# Patient Record
Sex: MALE | Race: WHITE | Employment: FULL TIME | URBAN - METROPOLITAN AREA
[De-identification: names, ages, dates, MRNs, and addresses within clinical notes are randomized per-mention and may not be internally consistent; named-entity substitution may affect disease eponyms.]

---

## 2017-05-12 ENCOUNTER — OFFICE VISIT (OUTPATIENT)
Dept: INTERNAL MEDICINE CLINIC | Age: 52
End: 2017-05-12

## 2017-05-12 ENCOUNTER — HOSPITAL ENCOUNTER (OUTPATIENT)
Dept: GENERAL RADIOLOGY | Age: 52
Discharge: HOME | End: 2017-05-12
Payer: COMMERCIAL

## 2017-05-12 ENCOUNTER — APPOINTMENT (OUTPATIENT)
Dept: LAB | Age: 52
End: 2017-05-12
Payer: COMMERCIAL

## 2017-05-12 ENCOUNTER — HOSPITAL ENCOUNTER (OUTPATIENT)
Dept: LAB | Age: 52
Discharge: HOME | End: 2017-05-12
Payer: COMMERCIAL

## 2017-05-12 VITALS
WEIGHT: 207 LBS | HEIGHT: 68 IN | TEMPERATURE: 97.3 F | RESPIRATION RATE: 16 BRPM | OXYGEN SATURATION: 97 % | SYSTOLIC BLOOD PRESSURE: 110 MMHG | HEART RATE: 68 BPM | DIASTOLIC BLOOD PRESSURE: 70 MMHG | BODY MASS INDEX: 31.37 KG/M2

## 2017-05-12 DIAGNOSIS — Z00.00 ROUTINE GENERAL MEDICAL EXAMINATION AT A HEALTH CARE FACILITY: Primary | ICD-10-CM

## 2017-05-12 DIAGNOSIS — R05.9 COUGH: ICD-10-CM

## 2017-05-12 DIAGNOSIS — Z00.00 ROUTINE GENERAL MEDICAL EXAMINATION AT A HEALTH CARE FACILITY: ICD-10-CM

## 2017-05-12 LAB
ALBUMIN SERPL BCP-MCNC: 4.1 G/DL (ref 3.4–5)
ALBUMIN/GLOB SERPL: 1.5 {RATIO} (ref 0.8–1.7)
ALP SERPL-CCNC: 58 U/L (ref 45–117)
ALT SERPL-CCNC: 22 U/L (ref 16–61)
ANION GAP BLD CALC-SCNC: 7 MMOL/L (ref 3–18)
AST SERPL W P-5'-P-CCNC: 15 U/L (ref 15–37)
BASOPHILS # BLD AUTO: 0 K/UL (ref 0–0.06)
BASOPHILS # BLD: 1 % (ref 0–2)
BILIRUB SERPL-MCNC: 0.5 MG/DL (ref 0.2–1)
BUN SERPL-MCNC: 15 MG/DL (ref 7–18)
BUN/CREAT SERPL: 15 (ref 12–20)
CALCIUM SERPL-MCNC: 8.7 MG/DL (ref 8.5–10.1)
CHLORIDE SERPL-SCNC: 106 MMOL/L (ref 100–108)
CHOLEST SERPL-MCNC: 153 MG/DL
CO2 SERPL-SCNC: 26 MMOL/L (ref 21–32)
CREAT SERPL-MCNC: 0.98 MG/DL (ref 0.6–1.3)
DIFFERENTIAL METHOD BLD: ABNORMAL
EOSINOPHIL # BLD: 0.1 K/UL (ref 0–0.4)
EOSINOPHIL NFR BLD: 1 % (ref 0–5)
ERYTHROCYTE [DISTWIDTH] IN BLOOD BY AUTOMATED COUNT: 13.1 % (ref 11.6–14.5)
GLOBULIN SER CALC-MCNC: 2.7 G/DL (ref 2–4)
GLUCOSE SERPL-MCNC: 100 MG/DL (ref 74–99)
HCT VFR BLD AUTO: 44.3 % (ref 36–48)
HDLC SERPL-MCNC: 42 MG/DL (ref 40–60)
HDLC SERPL: 3.6 {RATIO} (ref 0–5)
HGB BLD-MCNC: 14.9 G/DL (ref 13–16)
LDLC SERPL CALC-MCNC: 91 MG/DL (ref 0–100)
LIPID PROFILE,FLP: NORMAL
LYMPHOCYTES # BLD AUTO: 29 % (ref 21–52)
LYMPHOCYTES # BLD: 2.2 K/UL (ref 0.9–3.6)
MCH RBC QN AUTO: 31 PG (ref 24–34)
MCHC RBC AUTO-ENTMCNC: 33.6 G/DL (ref 31–37)
MCV RBC AUTO: 92.3 FL (ref 74–97)
MONOCYTES # BLD: 0.4 K/UL (ref 0.05–1.2)
MONOCYTES NFR BLD AUTO: 5 % (ref 3–10)
NEUTS SEG # BLD: 4.9 K/UL (ref 1.8–8)
NEUTS SEG NFR BLD AUTO: 64 % (ref 40–73)
PLATELET # BLD AUTO: 199 K/UL (ref 135–420)
PMV BLD AUTO: 12.4 FL (ref 9.2–11.8)
POTASSIUM SERPL-SCNC: 4.1 MMOL/L (ref 3.5–5.5)
PROT SERPL-MCNC: 6.8 G/DL (ref 6.4–8.2)
PSA SERPL-MCNC: 1 NG/ML (ref 0–4)
RBC # BLD AUTO: 4.8 M/UL (ref 4.7–5.5)
SODIUM SERPL-SCNC: 139 MMOL/L (ref 136–145)
TRIGL SERPL-MCNC: 100 MG/DL (ref ?–150)
VLDLC SERPL CALC-MCNC: 20 MG/DL
WBC # BLD AUTO: 7.7 K/UL (ref 4.6–13.2)

## 2017-05-12 PROCEDURE — 80053 COMPREHEN METABOLIC PANEL: CPT | Performed by: INTERNAL MEDICINE

## 2017-05-12 PROCEDURE — 85025 COMPLETE CBC W/AUTO DIFF WBC: CPT | Performed by: INTERNAL MEDICINE

## 2017-05-12 PROCEDURE — 71020 XR CHEST PA LAT: CPT

## 2017-05-12 PROCEDURE — 84153 ASSAY OF PSA TOTAL: CPT | Performed by: INTERNAL MEDICINE

## 2017-05-12 PROCEDURE — 80061 LIPID PANEL: CPT | Performed by: INTERNAL MEDICINE

## 2017-05-12 RX ORDER — BUPROPION HYDROCHLORIDE 150 MG/1
TABLET, EXTENDED RELEASE ORAL
Qty: 60 TAB | Refills: 6 | Status: SHIPPED | OUTPATIENT
Start: 2017-05-12 | End: 2017-12-18 | Stop reason: SDUPTHER

## 2017-05-12 NOTE — PROGRESS NOTES
1. Have you been to the ER, urgent care clinic since your last visit? Hospitalized since your last visit? No    2. Have you seen or consulted any other health care providers outside of the [address] [place] [address] since your last visit? Include any pap smears or colon screening.  No

## 2017-05-12 NOTE — PATIENT INSTRUCTIONS

## 2017-05-12 NOTE — MR AVS SNAPSHOT
Visit Information Date & Time Provider Department Dept. Phone Encounter #  
 5/12/2017 10:45 AM Marlon Interiano MD [facility] INTERNAL MEDICINE OF Bhavesh Andrade [phone] [id number] Follow-up Instructions Return if symptoms worsen or fail to improve. Your Appointments 6/22/2017  9:15 AM  
New Patient with Omar Cameron MD  
[facility] ([address]) Appt Note: NP, testicular lump, self refd [address] [place] [address]  
[address] [place] [address] Upcoming Health Maintenance Date Due DTaP/Tdap/Td series (1 - Tdap) 3/9/1988 FOBT Q 1 YEAR AGE 50-75 3/9/2017 INFLUENZA AGE 9 TO ADULT 8/1/2017 Allergies as of 5/12/2017  Review Complete On: 5/12/2017 By: Marlon Interiano MD  
 No Known Allergies Current Immunizations  Never Reviewed No immunizations on file. Not reviewed this visit You Were Diagnosed With   
  
 Codes Comments Routine general medical examination at a health care facility    -  Primary ICD-10-CM: Z00.00 ICD-9-CM: V70.0 Cough     ICD-10-CM: R05 ICD-9-CM: 170. 2 Vitals BP Pulse Temp Resp Height(growth percentile) 110/70 (BP 1 Location: Left arm, BP Patient Position: Sitting) 68 97.3 °F (36.3 °C) (Tympanic) 16 5' 8\" (1.727 m) Weight(growth percentile) SpO2 BMI Smoking Status 207 lb (93.9 kg) 97% 31.47 kg/m2 Heavy Tobacco Smoker BMI and BSA Data Body Mass Index Body Surface Area  
 31.47 kg/m 2 2.12 m 2 Preferred Pharmacy Pharmacy Name Phone [facility] [address] [phone] Your Updated Medication List  
  
   
This list is accurate as of: 5/12/17 11:10 AM.  Always use your most recent med list.  
  
  
  
  
 buPROPion  mg SR tablet Commonly known as:  Nara Sullivan  
 1 every day for 3 days then increase to 1 bid Prescriptions Sent to Pharmacy Refills buPROPion SR (WELLBUTRIN SR) 150 mg SR tablet 6 Si every day for 3 days then increase to 1 bid Class: Normal  
 Pharmacy: [facility] [address] [place] [address] Jean Rubio [address] [place] [address] #: [phone] Follow-up Instructions Return if symptoms worsen or fail to improve. To-Do List   
 2017 Imaging:  XR CHEST PA LAT Patient Instructions Well Visit, Men 48 to 72: Care Instructions Your Care Instructions Physical exams can help you stay healthy. Your doctor has checked your overall health and may have suggested ways to take good care of yourself. He or she also may have recommended tests. At home, you can help prevent illness with healthy eating, regular exercise, and other steps. Follow-up care is a key part of your treatment and safety. Be sure to make and go to all appointments, and call your doctor if you are having problems. It's also a good idea to know your test results and keep a list of the medicines you take. How can you care for yourself at home? · Reach and stay at a healthy weight. This will lower your risk for many problems, such as obesity, diabetes, heart disease, and high blood pressure. · Get at least 30 minutes of exercise on most days of the week. Walking is a good choice. You also may want to do other activities, such as running, swimming, cycling, or playing tennis or team sports. · Do not smoke. Smoking can make health problems worse. If you need help quitting, talk to your doctor about stop-smoking programs and medicines. These can increase your chances of quitting for good. · Protect your skin from too much sun. When you're outdoors from 10 a.m. to 4 p.m., stay in the shade or cover up with clothing and a hat with a wide brim. Wear sunglasses that block UV rays.  Even when it's cloudy, put broad-spectrum sunscreen (SPF 30 or higher) on any exposed skin. · See a dentist one or two times a year for checkups and to have your teeth cleaned. · Wear a seat belt in the car. · Limit alcohol to 2 drinks a day. Too much alcohol can cause health problems. Follow your doctor's advice about when to have certain tests. These tests can spot problems early. · Cholesterol. Your doctor will tell you how often to have this done based on your overall health and other things that can increase your risk for heart attack and stroke. · Blood pressure. Have your blood pressure checked during a routine doctor visit. Your doctor will tell you how often to check your blood pressure based on your age, your blood pressure results, and other factors. · Prostate exam. Talk to your doctor about whether you should have a blood test (called a PSA test) for prostate cancer. Experts disagree on whether men should have this test. Some experts recommend that you discuss the benefits and risks of the test with your doctor. · Diabetes. Ask your doctor whether you should have tests for diabetes. · Vision. Some experts recommend that you have yearly exams for glaucoma and other age-related eye problems starting at age 48. · Hearing. Tell your doctor if you notice any change in your hearing. You can have tests to find out how well you hear. · Colon cancer. You should begin tests for colon cancer at age 48. You may have one of several tests. Your doctor will tell you how often to have tests based on your age and risk. Risks include whether you already had a precancerous polyp removed from your colon or whether your parent, brother, sister, or child has had colon cancer. · Heart attack and stroke risk. At least every 4 to 6 years, you should have your risk for heart attack and stroke assessed.  Your doctor uses factors such as your age, blood pressure, cholesterol, and whether you smoke or have diabetes to show what your risk for a heart attack or stroke is over the next 10 years. · Abdominal aortic aneurysm. Ask your doctor whether you should have a test to check for an aneurysm. You may need a test if you ever smoked or if your parent, brother, sister, or child has had an aneurysm. When should you call for help? Watch closely for changes in your health, and be sure to contact your doctor if you have any problems or symptoms that concern you. Where can you learn more? Go to http://samir-nael.info/. Enter D950 in the search box to learn more about \"Well Visit, Men 48 to 72: Care Instructions. \" Current as of: July 19, 2016 Content Version: 11.2 © 8636-5698 [employer]. Care instructions adapted under license by [employer] (which disclaims liability or warranty for this information). If you have questions about a medical condition or this instruction, always ask your healthcare professional. [place] [address] any warranty or liability for your use of this information. Introducing [facility] & HEALTH SERVICES! [facility] introduces [employer] patient portal. Now you can access parts of your medical record, email your doctor's office, and request medication refills online. 1. In your internet browser, go to [email or web address] 2. Click on the First Time User? Click Here link in the Sign In box. You will see the New Member Sign Up page. 3. Enter your [employer] Access Code exactly as it appears below. You will not need to use this code after youve completed the sign-up process. If you do not sign up before the expiration date, you must request a new code. · [employer] Access Code: [email or web address] Expires: 8/10/2017 10:24 AM 
 
4. Enter the last four digits of your Social Security Number (xxxx) and Date of Birth (mm/dd/yyyy) as indicated and click Submit. You will be taken to the next sign-up page. 5. Create a [employer] ID. This will be your [employer] login ID and cannot be changed, so think of one that is secure and easy to remember. 6. Create a [employer] password. You can change your password at any time. 7. Enter your Password Reset Question and Answer. This can be used at a later time if you forget your password. 8. Enter your e-mail address. You will receive e-mail notification when new information is available in [address] 9. Click Sign Up. You can now view and download portions of your medical record. 10. Click the Download Summary menu link to download a portable copy of your medical information. If you have questions, please visit the Frequently Asked Questions section of the [employer] website. Remember, [employer] is NOT to be used for urgent needs. For medical emergencies, dial 911. Now available from your iPhone and Android! Please provide this summary of care documentation to your next provider. Your primary care clinician is listed as Berny Wadsworth. If you have any questions after today's visit, please call [phone].

## 2017-05-12 NOTE — PROGRESS NOTES
HPI:   NP evaluation  PMED hx notable for GERD (diet controlled)  Requests wellbutrin for tob cessation  Chronic intermittent cough (mild)  w/o chest pain/abd. discomfort; no dyspnea or pedal edema; denies constitutional complaints of fever, night sweats or wt loss; no evidence of GI/ hemorrhage; no polyuria/polydipsia. Activity is age appropriate. ROS is otherwise negative. Past Medical History:   Diagnosis Date    ADHD (attention deficit hyperactivity disorder)     GERD (gastroesophageal reflux disease)     Renal cyst     (R)       Past Surgical History:   Procedure Laterality Date    HX TONSIL AND ADENOIDECTOMY         Social History     Social History    Marital status: UNKNOWN     Spouse name: N/A    Number of children: N/A    Years of education: N/A     Occupational History    technician      Social History Main Topics    Smoking status: Heavy Tobacco Smoker    Smokeless tobacco: Not on file    Alcohol use 6.0 oz/week     10 Cans of beer per week    Drug use: No    Sexual activity: Not on file     Other Topics Concern    Not on file     Social History Narrative    No narrative on file       No Known Allergies    Family History   Problem Relation Age of Onset    Heart Disease Father     Hypertension Father          Meds: none      Visit Vitals    /70 (BP 1 Location: Left arm, BP Patient Position: Sitting)    Pulse 68    Temp 97.3 °F (36.3 °C) (Tympanic)    Resp 16    Ht 5' 8\" (1.727 m)    Wt 207 lb (93.9 kg)    SpO2 97%    BMI 31.47 kg/m2       PE  Well nourished in NAD  HEENT: PERRLA, EOMI;  OP: clear. Neck: supple w/o mass or bruits. Chest: clear. CV: RRR w/o m,r,g; pulses intact. Abd: soft, NT, w/o HSM or mass. Rectal: heme neg; prostate 3 FBS and smooth  Ext: w/o edema. Neuro: NF. Assessment and Plan    Encounter Diagnoses   Name Primary?     Routine general medical examination at a health care facility Yes    Cough    Except for his weight and tob use, appears well  Labs to assess metabolic parameters; colo due 2018 (hx of polyps)  Wellbutrin  mg every day for 3 days then increase to bid to help with tob cessation  I have reviewed/discussed the above normal BMI with the patient. I have recommended the following interventions: dietary management education, guidance, and counseling .  .    OV 12 mos or prn  I have explained plan to patient and the patient verbalizes understanding

## 2017-06-22 PROBLEM — N28.1 RENAL CYST: Status: ACTIVE | Noted: 2017-06-22

## 2017-06-22 PROBLEM — F17.210 CIGARETTE SMOKER: Status: ACTIVE | Noted: 2017-06-22

## 2017-07-18 ENCOUNTER — HOSPITAL ENCOUNTER (OUTPATIENT)
Dept: ULTRASOUND IMAGING | Age: 52
Discharge: HOME | End: 2017-07-18
Attending: UROLOGY
Payer: COMMERCIAL

## 2017-07-18 DIAGNOSIS — F17.210 CIGARETTE SMOKER: ICD-10-CM

## 2017-07-18 DIAGNOSIS — N28.1 RENAL CYST: ICD-10-CM

## 2017-07-18 PROCEDURE — 76770 US EXAM ABDO BACK WALL COMP: CPT

## 2017-08-21 ENCOUNTER — OFFICE VISIT (OUTPATIENT)
Dept: INTERNAL MEDICINE CLINIC | Age: 52
End: 2017-08-21

## 2017-08-21 VITALS
WEIGHT: 204 LBS | SYSTOLIC BLOOD PRESSURE: 138 MMHG | TEMPERATURE: 97.8 F | HEART RATE: 78 BPM | BODY MASS INDEX: 30.92 KG/M2 | OXYGEN SATURATION: 96 % | HEIGHT: 68 IN | RESPIRATION RATE: 16 BRPM | DIASTOLIC BLOOD PRESSURE: 80 MMHG

## 2017-08-21 DIAGNOSIS — R00.2 PALPITATIONS: Primary | ICD-10-CM

## 2017-08-21 DIAGNOSIS — R07.89 ATYPICAL CHEST PAIN: ICD-10-CM

## 2017-08-21 NOTE — PROGRESS NOTES
1. Have you been to the ER, urgent care clinic since your last visit? Hospitalized since your last visit? No    2. Have you seen or consulted any other health care providers outside of the [address] [place] [address] since your last visit? Include any pap smears or colon screening.  No

## 2017-08-21 NOTE — PROGRESS NOTES
HPI:   2 days of fleeting CP with palpitations lasting seconds w/o associated dyspnea, pedal edema; pain aggravated by positional change; occurred post becoming overheated doing yard work    ROS is otherwise negative. Past Medical History:   Diagnosis Date    ADHD (attention deficit hyperactivity disorder)     GERD (gastroesophageal reflux disease)     Renal cyst     (R)       Past Surgical History:   Procedure Laterality Date    HX COLONOSCOPY  2013    polyp    HX TONSIL AND ADENOIDECTOMY         Social History     Social History    Marital status: UNKNOWN     Spouse name: N/A    Number of children: N/A    Years of education: N/A     Occupational History    technician      Social History Main Topics    Smoking status: Heavy Tobacco Smoker    Smokeless tobacco: Never Used    Alcohol use 6.0 oz/week     10 Cans of beer per week    Drug use: No    Sexual activity: Not on file     Other Topics Concern    Not on file     Social History Narrative       No Known Allergies    Family History   Problem Relation Age of Onset    Heart Disease Father     Hypertension Father        Current Outpatient Prescriptions   Medication Sig Dispense Refill    buPROPion SR (WELLBUTRIN SR) 150 mg SR tablet 1 every day for 3 days then increase to 1 bid 60 Tab 6           Visit Vitals    /80 (BP 1 Location: Left arm, BP Patient Position: Sitting)    Pulse 78    Temp 97.8 °F (36.6 °C) (Tympanic)    Resp 16    Ht 5' 8\" (1.727 m)    Wt 204 lb (92.5 kg)    SpO2 96%    BMI 31.02 kg/m2       PE  Well nourished in NAD  HEENT:  OP: clear. Neck: supple w/o mass or bruits. Chest: clear. Chest wall: mild (L) anterior tenderness reproducing his pain  CV: RRR w/o m,r,g; pulses intact. Abd: soft, NT, w/o HSM or mass. Ext: w/o edema. Neuro: NF.  ECG: WNL    Assessment and Plan    Encounter Diagnoses   Name Primary?     Atypical chest pain Yes       Atypical CP/palpitations - reassured; increase fluids; cautioned pt about taking breaks when working in heat  to ER if worsens; call if unimproved 7 days  I have reviewed/discussed the above normal BMI with the patient. I have recommended the following interventions: dietary management education, guidance, and counseling .  .    OV 6 mos or prn  I have explained plan to patient and the patient verbalizes understanding

## 2017-08-21 NOTE — PATIENT INSTRUCTIONS
Chest Pain: Care Instructions  Your Care Instructions  There are many things that can cause chest pain. Some are not serious and will get better on their own in a few days. But some kinds of chest pain need more testing and treatment. Your doctor may have recommended a follow-up visit in the next 8 to 12 hours. If you are not getting better, you may need more tests or treatment. Even though your doctor has released you, you still need to watch for any problems. The doctor carefully checked you, but sometimes problems can develop later. If you have new symptoms or if your symptoms do not get better, get medical care right away. If you have worse or different chest pain or pressure that lasts more than 5 minutes or you passed out (lost consciousness), call 911 or seek other emergency help right away. A medical visit is only one step in your treatment. Even if you feel better, you still need to do what your doctor recommends, such as going to all suggested follow-up appointments and taking medicines exactly as directed. This will help you recover and help prevent future problems. How can you care for yourself at home? · Rest until you feel better. · Take your medicine exactly as prescribed. Call your doctor if you think you are having a problem with your medicine. · Do not drive after taking a prescription pain medicine. When should you call for help? Call 911 if:  · You passed out (lost consciousness). · You have severe difficulty breathing. · You have symptoms of a heart attack. These may include:  ¨ Chest pain or pressure, or a strange feeling in your chest.  ¨ Sweating. ¨ Shortness of breath. ¨ Nausea or vomiting. ¨ Pain, pressure, or a strange feeling in your back, neck, jaw, or upper belly or in one or both shoulders or arms. ¨ Lightheadedness or sudden weakness. ¨ A fast or irregular heartbeat.   After you call 911, the  may tell you to chew 1 adult-strength or 2 to 4 low-dose aspirin. Wait for an ambulance. Do not try to drive yourself. Call your doctor today if:  · You have any trouble breathing. · Your chest pain gets worse. · You are dizzy or lightheaded, or you feel like you may faint. · You are not getting better as expected. · You are having new or different chest pain. Where can you learn more? Go to http://samir-nael.info/. Enter A120 in the search box to learn more about \"Chest Pain: Care Instructions. \"  Current as of: March 20, 2017  Content Version: 11.3  © 0973-7649 [employer]. Care instructions adapted under license by [employer] (which disclaims liability or warranty for this information). If you have questions about a medical condition or this instruction, always ask your healthcare professional. [address] any warranty or liability for your use of this information.

## 2017-12-13 ENCOUNTER — TELEPHONE (OUTPATIENT)
Dept: INTERNAL MEDICINE CLINIC | Age: 52
End: 2017-12-13

## 2017-12-13 DIAGNOSIS — R41.3 MEMORY DISTURBANCE: Primary | ICD-10-CM

## 2017-12-13 NOTE — TELEPHONE ENCOUNTER
Patient called for referral to Neurology. He has been noticing an increase in memory issues. He is looking for a [address] [place]. Please call him at [phone].

## 2017-12-18 ENCOUNTER — OFFICE VISIT (OUTPATIENT)
Dept: NEUROLOGY | Age: 52
End: 2017-12-18

## 2017-12-18 VITALS
TEMPERATURE: 99.4 F | HEIGHT: 68 IN | HEART RATE: 72 BPM | BODY MASS INDEX: 31.46 KG/M2 | WEIGHT: 207.6 LBS | DIASTOLIC BLOOD PRESSURE: 72 MMHG | OXYGEN SATURATION: 94 % | RESPIRATION RATE: 16 BRPM | SYSTOLIC BLOOD PRESSURE: 122 MMHG

## 2017-12-18 DIAGNOSIS — R06.83 SNORING: ICD-10-CM

## 2017-12-18 DIAGNOSIS — Z86.59 HISTORY OF ATTENTION DEFICIT DISORDER: ICD-10-CM

## 2017-12-18 DIAGNOSIS — Z87.820 HISTORY OF MULTIPLE CONCUSSIONS: ICD-10-CM

## 2017-12-18 DIAGNOSIS — G47.00 INSOMNIA W/ SLEEP APNEA: ICD-10-CM

## 2017-12-18 DIAGNOSIS — R41.3 MEMORY DISTURBANCE: Primary | ICD-10-CM

## 2017-12-18 DIAGNOSIS — G47.30 INSOMNIA W/ SLEEP APNEA: ICD-10-CM

## 2017-12-18 RX ORDER — BUPROPION HYDROCHLORIDE 150 MG/1
TABLET, EXTENDED RELEASE ORAL
Qty: 60 TAB | Refills: 6 | Status: SHIPPED | OUTPATIENT
Start: 2017-12-18

## 2017-12-18 RX ORDER — CALCIUM CARBONATE 200(500)MG
1 TABLET,CHEWABLE ORAL DAILY
COMMUNITY
End: 2018-08-28

## 2017-12-18 NOTE — MR AVS SNAPSHOT
[facility] Jean Rubio [address] [place] [address] [phone] Your Updated Medication List  
  
   
This list is accurate as of: 12/18/17  3:52 PM.  Always use your most recent med list.  
  
  
  
  
 * JAMES-SELTZER ANTACID PO Take  by mouth. * calcium carbonate 200 mg calcium (500 mg) Chew Commonly known as:  TUMS Take 1 Tab by mouth daily. buPROPion  mg SR tablet Commonly known as:  WELLBUTRIN SR  
1 every day for 3 days then increase to 1 bid * Notice: This list has 2 medication(s) that are the same as other medications prescribed for you. Read the directions carefully, and ask your doctor or other care provider to review them with you. We Performed the Following REFERRAL TO NEUROPSYCHOLOGY [OZM10 Custom] Comments:  
 Please evaluate patient for cognitive dysfunction memory loss, history of attention deficit disorder Follow-up Instructions Return in about 2 months (around 2/18/2018). To-Do List   
 12/18/2017 Imaging:  MRI BRAIN WO CONT   
  
 12/18/2017 Lab:  TSH 3RD GENERATION   
  
 12/18/2017 Lab:  VITAMIN B12 Referral Information Referral ID Referred By Referred To  
  
 [id number] Tiffanie FLORES Not Available Visits Status Start Date End Date 1 New Request 12/18/17 12/18/18 If your referral has a status of pending review or denied, additional information will be sent to support the outcome of this decision. Patient Instructions No driving drowsy Introducing [facility] & HEALTH SERVICES! Aleida Velarde introduces [employer] patient portal. Now you can access parts of your medical record, email your doctor's office, and request medication refills online. 1. In your internet browser, go to [email or web address] 2. Click on the First Time User? Click Here link in the Sign In box. You will see the New Member Sign Up page. 3. Enter your [employer] Access Code exactly as it appears below. You will not need to use this code after youve completed the sign-up process. If you do not sign up before the expiration date, you must request a new code. · [employer] Access Code: [email or web address] Expires: 3/18/2018  2:36 PM 
 
4. Enter the last four digits of your Social Security Number (xxxx) and Date of Birth (mm/dd/yyyy) as indicated and click Submit. You will be taken to the next sign-up page. 5. Create a [employer] ID. This will be your [employer] login ID and cannot be changed, so think of one that is secure and easy to remember. 6. Create a [employer] password. You can change your password at any time. 7. Enter your Password Reset Question and Answer. This can be used at a later time if you forget your password. 8. Enter your e-mail address. You will receive e-mail notification when new information is available in [address] 9. Click Sign Up. You can now view and download portions of your medical record. 10. Click the Download Summary menu link to download a portable copy of your medical information. If you have questions, please visit the Frequently Asked Questions section of the [employer] website. Remember, [employer] is NOT to be used for urgent needs. For medical emergencies, dial 911. Now available from your iPhone and Android! Please provide this summary of care documentation to your next provider. Your primary care clinician is listed as Heather Pacheco. If you have any questions after today's visit, please call [phone].

## 2017-12-18 NOTE — PROGRESS NOTES
[facility]             [address] [place], [address]    12/18/2017    HPI:  Andres Tan is a 50 y.o., right handed,   male, who presents with memory disturbance and other cognitive issues. Patient reports that he was previously diagnosed with attention deficit disorder but reports being taking off of Ritalin since he could not reliably remember to take the medication. He reports that gradually he has noted worsening of memory more short-term and recent or long-term. He also notes increased difficulty with concentration easy distractibility some word finding difficulty difficulty focusing and missed placement of items. He does well with calculations. He does report head trauma with 3 concussions in the past.  Patient reports he no longer drinks heavily on average 6 beers a week he continues to smoke. He does note some sleep disturbances attributed to GERD and loud snoring. His girlfriend reports that he stops breathing at night          Ess=7/24; stop bang=6/8    Current Outpatient Prescriptions   Medication Sig Dispense Refill    buPROPion SR (WELLBUTRIN SR) 150 mg SR tablet 1 every day for 3 days then increase to 1 bid 60 Tab 6    CALCIUM CARBONATE (JAMES-SELTZER ANTACID PO) Take  by mouth.  calcium carbonate (TUMS) 200 mg calcium (500 mg) chew Take 1 Tab by mouth daily.          Past Medical History:   Diagnosis Date    ADHD (attention deficit hyperactivity disorder)     Diverticulosis     GERD (gastroesophageal reflux disease)     Renal cyst     (R)       Past Surgical History:   Procedure Laterality Date    HX COLONOSCOPY  2013    polyp    HX TONSIL AND ADENOIDECTOMY       Family History   Problem Relation Age of Onset    Heart Disease Father     Hypertension Father      No Known Allergies    Review of Systems:   Review of Systems - History obtained from the patient  General ROS: positive for  - sleep disturbance  Psychological ROS: positive for - concentration difficulties and memory difficulties  ENT ROS: negative  Hematological and Lymphatic ROS: negative  Endocrine ROS: negative  Respiratory ROS: no cough, shortness of breath, or wheezing  Cardiovascular ROS: no chest pain or dyspnea on exertion  Gastrointestinal ROS: no abdominal pain, change in bowel habits, or black or bloody stools  Genito-Urinary ROS: no dysuria, trouble voiding, or hematuria  Musculoskeletal ROS: negative  Neurological ROS: positive for - memory loss  Dermatological ROS: negative    PHYSICAL EXAMINATION:    Visit Vitals    /72 (BP 1 Location: Left arm, BP Patient Position: Sitting)    Pulse 72    Temp 99.4 °F (37.4 °C) (Oral)    Resp 16    Ht 5' 8\" (1.727 m)    Wt 94.2 kg (207 lb 9.6 oz)    SpO2 94%    BMI 31.57 kg/m2    nc=45 cm  General:  Well defined, nourished, and groomed individual in no acute distress. Neck: Supple, nontender, thyroid within normal limits, no JVD, no bruits, no pain with resistance to active range of motion. Heart: Regular rate and rhythm, no murmurs, rub, or gallop. Normal S1S2. Lungs:  Clear to auscultation bilaterally with equal chest expansion, no cough, no wheeze  Musculoskeletal:  Extremities revealed no edema and had full range of motion of joints. Psych:  Good mood and normal affect    NEUROLOGICAL EXAMINATION:     Mental Status:   Alert and oriented to person, place, and time with recent and remote memory intact. Attention span and concentration are normal. Speech is fluent with a full fund of knowledge. 3/3 items recalled    Cranial Nerves:    II, III, IV, VI:  Visual acuity grossly intact. Visual fields are normal.    Pupils are equal, round, and reactive to light and accommodation. Extra-ocular movements are full and fluid. Fundoscopic exam was not well visualized, no ptosis or nystagmus. V-XII: Hearing is grossly intact.   Facial features are symmetric, with normal sensation and strength. The palate rises symmetrically and the tongue protrudes midline. Sternocleidomastoids 5/5. Motor Examination: Normal tone, bulk, and strength, 5/5 muscle strength throughout. No cogwheel rigidity or clonus present. Sensory exam:  Normal throughout to pinprick, temperature, and vibration sense. Normal proprioception. Coordination:  Heel-to-shin was smooth and symmetrical bilaterally. Finger to nose and rapid arm movement testing was normal.   No resting or intention tremor    Gait and Station:  Steady while walking on toes, heels, and with tandem walking. Normal arm swing. No Romberg or pronator drift. No muscle wasting or fasiculations noted. Reflexes:  DTRs depressed throughout. Toes downgoing. Assessment and Plan:   Shana King is a 50 y.o. right handed male whose history and physical are consistent with cognitive difficulties including stm as well as sleep disturbance. Shana King who has risk factors including history of attention deficit disorder and possssible cami    Diagnoses and all orders for this visit:    1. Memory disturbance  -     MRI BRAIN WO CONT; Future  -     TSH 3RD GENERATION; Future  -     VITAMIN B12; Future  -     REFERRAL TO NEUROPSYCHOLOGY    2. History of attention deficit disorder  -     MRI BRAIN WO CONT; Future  -     TSH 3RD GENERATION; Future  -     VITAMIN B12; Future  -     REFERRAL TO NEUROPSYCHOLOGY    3. Insomnia w/ sleep apnea  -     MRI BRAIN WO CONT; Future  -     TSH 3RD GENERATION; Future  -     VITAMIN B12; Future  -     REFERRAL TO NEUROPSYCHOLOGY    4. Snoring  -     MRI BRAIN WO CONT; Future  -     TSH 3RD GENERATION; Future  -     VITAMIN B12; Future  -     REFERRAL TO NEUROPSYCHOLOGY    5. BMI 31.0-31.9,adult  -     MRI BRAIN WO CONT; Future  -     TSH 3RD GENERATION; Future  -     VITAMIN B12; Future  -     REFERRAL TO NEUROPSYCHOLOGY    6.  History of multiple concussions  -     MRI BRAIN WO CONT; Future  -     TSH 3RD GENERATION; Future  -     VITAMIN B12; Future  -     REFERRAL TO NEUROPSYCHOLOGY      Follow-up Disposition:  Return in about 2 months (around 2/18/2018). Will need polysomnographic study with patient to call when able to schedule  Reviewed work up planned,no driving drowsy  I spent 60 minutes with the patient in face-to-face consultation, of which greater than 50% was spent in counseling and coordination of care as described above.

## 2018-01-08 ENCOUNTER — OFFICE VISIT (OUTPATIENT)
Dept: INTERNAL MEDICINE CLINIC | Age: 53
End: 2018-01-08

## 2018-01-08 VITALS
HEIGHT: 68 IN | SYSTOLIC BLOOD PRESSURE: 120 MMHG | HEART RATE: 80 BPM | RESPIRATION RATE: 16 BRPM | WEIGHT: 207 LBS | DIASTOLIC BLOOD PRESSURE: 78 MMHG | BODY MASS INDEX: 31.37 KG/M2 | OXYGEN SATURATION: 97 % | TEMPERATURE: 98.2 F

## 2018-01-08 DIAGNOSIS — R05.9 COUGH: ICD-10-CM

## 2018-01-08 DIAGNOSIS — J06.9 ACUTE URI: Primary | ICD-10-CM

## 2018-01-08 DIAGNOSIS — Z23 ENCOUNTER FOR IMMUNIZATION: ICD-10-CM

## 2018-01-08 RX ORDER — AMOXICILLIN AND CLAVULANATE POTASSIUM 875; 125 MG/1; MG/1
TABLET, FILM COATED ORAL
Qty: 20 TAB | Refills: 0 | Status: SHIPPED | OUTPATIENT
Start: 2018-01-08 | End: 2018-08-28

## 2018-01-08 RX ORDER — CODEINE PHOSPHATE AND GUAIFENESIN 10; 100 MG/5ML; MG/5ML
SOLUTION ORAL
Qty: 120 ML | Refills: 1 | Status: SHIPPED | OUTPATIENT
Start: 2018-01-08

## 2018-01-08 NOTE — PATIENT INSTRUCTIONS
Upper Respiratory Infection (Cold): Care Instructions  Your Care Instructions    An upper respiratory infection, or URI, is an infection of the nose, sinuses, or throat. URIs are spread by coughs, sneezes, and direct contact. The common cold is the most frequent kind of URI. The flu and sinus infections are other kinds of URIs. Almost all URIs are caused by viruses. Antibiotics won't cure them. But you can treat most infections with home care. This may include drinking lots of fluids and taking over-the-counter pain medicine. You will probably feel better in 4 to 10 days. The doctor has checked you carefully, but problems can develop later. If you notice any problems or new symptoms, get medical treatment right away. Follow-up care is a key part of your treatment and safety. Be sure to make and go to all appointments, and call your doctor if you are having problems. It's also a good idea to know your test results and keep a list of the medicines you take. How can you care for yourself at home? · To prevent dehydration, drink plenty of fluids, enough so that your urine is light yellow or clear like water. Choose water and other caffeine-free clear liquids until you feel better. If you have kidney, heart, or liver disease and have to limit fluids, talk with your doctor before you increase the amount of fluids you drink. · Take an over-the-counter pain medicine, such as acetaminophen (Tylenol), ibuprofen (Advil, Motrin), or naproxen (Aleve). Read and follow all instructions on the label. · Before you use cough and cold medicines, check the label. These medicines may not be safe for young children or for people with certain health problems. · Be careful when taking over-the-counter cold or flu medicines and Tylenol at the same time. Many of these medicines have acetaminophen, which is Tylenol. Read the labels to make sure that you are not taking more than the recommended dose.  Too much acetaminophen (Tylenol) can be harmful. · Get plenty of rest.  · Do not smoke or allow others to smoke around you. If you need help quitting, talk to your doctor about stop-smoking programs and medicines. These can increase your chances of quitting for good. When should you call for help? Call 911 anytime you think you may need emergency care. For example, call if:  ? · You have severe trouble breathing. ?Call your doctor now or seek immediate medical care if:  ? · You seem to be getting much sicker. ? · You have new or worse trouble breathing. ? · You have a new or higher fever. ? · You have a new rash. ? Watch closely for changes in your health, and be sure to contact your doctor if:  ? · You have a new symptom, such as a sore throat, an earache, or sinus pain. ? · You cough more deeply or more often, especially if you notice more mucus or a change in the color of your mucus. ? · You do not get better as expected. Where can you learn more? Go to http://samir-nael.info/. Enter P400 in the search box to learn more about \"Upper Respiratory Infection (Cold): Care Instructions. \"  Current as of: May 12, 2017  Content Version: 11.4  © 2308-2320 [employer]. Care instructions adapted under license by [employer] (which disclaims liability or warranty for this information). If you have questions about a medical condition or this instruction, always ask your healthcare professional. [place] [address] any warranty or liability for your use of this information. Body Mass Index: Care Instructions  Your Care Instructions    Body mass index (BMI) can help you see if your weight is raising your risk for health problems. It uses a formula to compare how much you weigh with how tall you are. · A BMI lower than 18.5 is considered underweight. · A BMI between 18.5 and 24.9 is considered healthy. · A BMI between 25 and 29.9 is considered overweight.  A BMI of 30 or higher is considered obese. If your BMI is in the normal range, it means that you have a lower risk for weight-related health problems. If your BMI is in the overweight or obese range, you may be at increased risk for weight-related health problems, such as high blood pressure, heart disease, stroke, arthritis or joint pain, and diabetes. If your BMI is in the underweight range, you may be at increased risk for health problems such as fatigue, lower protection (immunity) against illness, muscle loss, bone loss, hair loss, and hormone problems. BMI is just one measure of your risk for weight-related health problems. You may be at higher risk for health problems if you are not active, you eat an unhealthy diet, or you drink too much alcohol or use tobacco products. Follow-up care is a key part of your treatment and safety. Be sure to make and go to all appointments, and call your doctor if you are having problems. It's also a good idea to know your test results and keep a list of the medicines you take. How can you care for yourself at home? · Practice healthy eating habits. This includes eating plenty of fruits, vegetables, whole grains, lean protein, and low-fat dairy. · If your doctor recommends it, get more exercise. Walking is a good choice. Bit by bit, increase the amount you walk every day. Try for at least 30 minutes on most days of the week. · Do not smoke. Smoking can increase your risk for health problems. If you need help quitting, talk to your doctor about stop-smoking programs and medicines. These can increase your chances of quitting for good. · Limit alcohol to 2 drinks a day for men and 1 drink a day for women. Too much alcohol can cause health problems. If you have a BMI higher than 25  · Your doctor may do other tests to check your risk for weight-related health problems.  This may include measuring the distance around your waist. A waist measurement of more than 40 inches in men or 35 inches in women can increase the risk of weight-related health problems. · Talk with your doctor about steps you can take to stay healthy or improve your health. You may need to make lifestyle changes to lose weight and stay healthy, such as changing your diet and getting regular exercise. If you have a BMI lower than 18.5  · Your doctor may do other tests to check your risk for health problems. · Talk with your doctor about steps you can take to stay healthy or improve your health. You may need to make lifestyle changes to gain or maintain weight and stay healthy, such as getting more healthy foods in your diet and doing exercises to build muscle. Where can you learn more? Go to http://samir-nael.info/. Enter S176 in the search box to learn more about \"Body Mass Index: Care Instructions. \"  Current as of: October 13, 2016  Content Version: 11.4  © 5849-4688 [employer], [employer]. Care instructions adapted under license by [employer] (which disclaims liability or warranty for this information). If you have questions about a medical condition or this instruction, always ask your healthcare professional. [address] any warranty or liability for your use of this information.

## 2018-01-08 NOTE — PROGRESS NOTES
1. Have you been to the ER, urgent care clinic since your last visit? Hospitalized since your last visit? No    2. Have you seen or consulted any other health care providers outside of the [address] [place] [address] since your last visit? Include any pap smears or colon screening.  No

## 2018-01-08 NOTE — PROGRESS NOTES
HPI:   7 days of head congestion, facial discomfort, NP cough w/o fever, dyspnea, CP, or N      ROS is otherwise negative. Past Medical History:   Diagnosis Date    ADHD (attention deficit hyperactivity disorder)     Diverticulosis     GERD (gastroesophageal reflux disease)     Renal cyst     (R)       Past Surgical History:   Procedure Laterality Date    HX COLONOSCOPY  2013    polyp    HX TONSIL AND ADENOIDECTOMY         Social History     Social History    Marital status: UNKNOWN     Spouse name: N/A    Number of children: N/A    Years of education: N/A     Occupational History    technician      Social History Main Topics    Smoking status: Current Every Day Smoker     Packs/day: 1.00    Smokeless tobacco: Never Used    Alcohol use 3.6 oz/week     6 Cans of beer per week    Drug use: No    Sexual activity: Not on file     Other Topics Concern    Not on file     Social History Narrative       No Known Allergies    Family History   Problem Relation Age of Onset    Heart Disease Father     Hypertension Father        Current Outpatient Prescriptions   Medication Sig Dispense Refill    buPROPion SR (WELLBUTRIN SR) 150 mg SR tablet 1 every day for 3 days then increase to 1 bid 60 Tab 6    CALCIUM CARBONATE (JAMES-SELTZER ANTACID PO) Take  by mouth.  calcium carbonate (TUMS) 200 mg calcium (500 mg) chew Take 1 Tab by mouth daily. Visit Vitals    /78 (BP 1 Location: Left arm, BP Patient Position: Sitting)    Pulse 80    Temp 98.2 °F (36.8 °C) (Tympanic)    Resp 16    Ht 5' 8\" (1.727 m)    Wt 207 lb (93.9 kg)    SpO2 97%    BMI 31.47 kg/m2       PE  Well nourished in NAD  HEENT: OP: clear. Neck: supple w/o mass or bruits. Chest: scattered (B) exp wheezing  CV: RRR w/o m,r,g; pulses intact. Abd: soft, NT, w/o HSM or mass. Ext: w/o edema. Neuro: NF. Assessment and Plan    Encounter Diagnoses   Name Primary?     Acute URI Yes    Cough     Encounter for immunization        Sinusitis/cough  augmentin 875 bid with food for 10 days  Claritin/ravi AC prn  F/U worsening or unimproved 7 days  Flu vaccine declined  The patient was counseled on the dangers of tobacco use, and was advised to quit. Reviewed strategies to maximize success, including removing cigarettes and smoking materials from environment. OV 6 mos or prn  I have explained plan to patient and the patient verbalizes understanding    Discussed the patient's BMI with him. The BMI follow up plan is as follows:     dietary management education, guidance, and counseling  encourage exercise  monitor weight  prescribed dietary intake    An After Visit Summary was printed and given to the patient.

## 2018-08-18 DIAGNOSIS — Z00.00 ROUTINE GENERAL MEDICAL EXAMINATION AT A HEALTH CARE FACILITY: Primary | ICD-10-CM

## 2018-08-28 ENCOUNTER — HOSPITAL ENCOUNTER (OUTPATIENT)
Dept: LAB | Age: 53
Discharge: HOME | End: 2018-08-28
Payer: COMMERCIAL

## 2018-08-28 ENCOUNTER — OFFICE VISIT (OUTPATIENT)
Dept: INTERNAL MEDICINE CLINIC | Age: 53
End: 2018-08-28

## 2018-08-28 VITALS
DIASTOLIC BLOOD PRESSURE: 82 MMHG | OXYGEN SATURATION: 97 % | TEMPERATURE: 97.6 F | RESPIRATION RATE: 16 BRPM | WEIGHT: 210 LBS | HEIGHT: 68 IN | BODY MASS INDEX: 31.83 KG/M2 | HEART RATE: 71 BPM | SYSTOLIC BLOOD PRESSURE: 136 MMHG

## 2018-08-28 DIAGNOSIS — Z00.00 ROUTINE GENERAL MEDICAL EXAMINATION AT A HEALTH CARE FACILITY: ICD-10-CM

## 2018-08-28 DIAGNOSIS — Z00.00 ROUTINE GENERAL MEDICAL EXAMINATION AT A HEALTH CARE FACILITY: Primary | ICD-10-CM

## 2018-08-28 DIAGNOSIS — Z12.11 SCREEN FOR COLON CANCER: ICD-10-CM

## 2018-08-28 LAB
ALBUMIN SERPL-MCNC: 4.1 G/DL (ref 3.4–5)
ALBUMIN/GLOB SERPL: 1.4 {RATIO} (ref 0.8–1.7)
ALP SERPL-CCNC: 63 U/L (ref 45–117)
ALT SERPL-CCNC: 34 U/L (ref 16–61)
ANION GAP SERPL CALC-SCNC: 6 MMOL/L (ref 3–18)
AST SERPL-CCNC: 20 U/L (ref 15–37)
BASOPHILS # BLD: 0 K/UL (ref 0–0.1)
BASOPHILS NFR BLD: 0 % (ref 0–2)
BILIRUB SERPL-MCNC: 0.5 MG/DL (ref 0.2–1)
BUN SERPL-MCNC: 16 MG/DL (ref 7–18)
BUN/CREAT SERPL: 15 (ref 12–20)
CALCIUM SERPL-MCNC: 8.6 MG/DL (ref 8.5–10.1)
CHLORIDE SERPL-SCNC: 105 MMOL/L (ref 100–108)
CHOLEST SERPL-MCNC: 148 MG/DL
CO2 SERPL-SCNC: 30 MMOL/L (ref 21–32)
CREAT SERPL-MCNC: 1.1 MG/DL (ref 0.6–1.3)
DIFFERENTIAL METHOD BLD: NORMAL
EOSINOPHIL # BLD: 0.2 K/UL (ref 0–0.4)
EOSINOPHIL NFR BLD: 2 % (ref 0–5)
ERYTHROCYTE [DISTWIDTH] IN BLOOD BY AUTOMATED COUNT: 12.5 % (ref 11.6–14.5)
GLOBULIN SER CALC-MCNC: 2.9 G/DL (ref 2–4)
GLUCOSE SERPL-MCNC: 110 MG/DL (ref 74–99)
HCT VFR BLD AUTO: 42.7 % (ref 36–48)
HDLC SERPL-MCNC: 46 MG/DL (ref 40–60)
HDLC SERPL: 3.2 {RATIO} (ref 0–5)
HGB BLD-MCNC: 14.9 G/DL (ref 13–16)
LDLC SERPL CALC-MCNC: 83.4 MG/DL (ref 0–100)
LIPID PROFILE,FLP: NORMAL
LYMPHOCYTES # BLD: 1.7 K/UL (ref 0.9–3.6)
LYMPHOCYTES NFR BLD: 25 % (ref 21–52)
MCH RBC QN AUTO: 31.6 PG (ref 24–34)
MCHC RBC AUTO-ENTMCNC: 34.9 G/DL (ref 31–37)
MCV RBC AUTO: 90.5 FL (ref 74–97)
MONOCYTES # BLD: 0.5 K/UL (ref 0.05–1.2)
MONOCYTES NFR BLD: 8 % (ref 3–10)
NEUTS SEG # BLD: 4.2 K/UL (ref 1.8–8)
NEUTS SEG NFR BLD: 65 % (ref 40–73)
PLATELET # BLD AUTO: 241 K/UL (ref 135–420)
PMV BLD AUTO: 11.3 FL (ref 9.2–11.8)
POTASSIUM SERPL-SCNC: 4.8 MMOL/L (ref 3.5–5.5)
PROT SERPL-MCNC: 7 G/DL (ref 6.4–8.2)
PSA SERPL-MCNC: 0.8 NG/ML (ref 0–4)
RBC # BLD AUTO: 4.72 M/UL (ref 4.7–5.5)
SODIUM SERPL-SCNC: 141 MMOL/L (ref 136–145)
TRIGL SERPL-MCNC: 93 MG/DL (ref ?–150)
VLDLC SERPL CALC-MCNC: 18.6 MG/DL
WBC # BLD AUTO: 6.6 K/UL (ref 4.6–13.2)

## 2018-08-28 PROCEDURE — 85025 COMPLETE CBC W/AUTO DIFF WBC: CPT | Performed by: INTERNAL MEDICINE

## 2018-08-28 PROCEDURE — 84153 ASSAY OF PSA TOTAL: CPT | Performed by: INTERNAL MEDICINE

## 2018-08-28 PROCEDURE — 80061 LIPID PANEL: CPT | Performed by: INTERNAL MEDICINE

## 2018-08-28 PROCEDURE — 80053 COMPREHEN METABOLIC PANEL: CPT | Performed by: INTERNAL MEDICINE

## 2018-08-28 PROCEDURE — 36415 COLL VENOUS BLD VENIPUNCTURE: CPT | Performed by: INTERNAL MEDICINE

## 2018-08-28 NOTE — PATIENT INSTRUCTIONS
Well Visit, Men 48 to 72: Care Instructions Your Care Instructions Physical exams can help you stay healthy. Your doctor has checked your overall health and may have suggested ways to take good care of yourself. He or she also may have recommended tests. At home, you can help prevent illness with healthy eating, regular exercise, and other steps. Follow-up care is a key part of your treatment and safety. Be sure to make and go to all appointments, and call your doctor if you are having problems. It's also a good idea to know your test results and keep a list of the medicines you take. How can you care for yourself at home? · Reach and stay at a healthy weight. This will lower your risk for many problems, such as obesity, diabetes, heart disease, and high blood pressure. · Get at least 30 minutes of exercise on most days of the week. Walking is a good choice. You also may want to do other activities, such as running, swimming, cycling, or playing tennis or team sports. · Do not smoke. Smoking can make health problems worse. If you need help quitting, talk to your doctor about stop-smoking programs and medicines. These can increase your chances of quitting for good. · Protect your skin from too much sun. When you're outdoors from 10 a.m. to 4 p.m., stay in the shade or cover up with clothing and a hat with a wide brim. Wear sunglasses that block UV rays. Even when it's cloudy, put broad-spectrum sunscreen (SPF 30 or higher) on any exposed skin. · See a dentist one or two times a year for checkups and to have your teeth cleaned. · Wear a seat belt in the car. · Limit alcohol to 2 drinks a day. Too much alcohol can cause health problems. Follow your doctor's advice about when to have certain tests. These tests can spot problems early. · Cholesterol.  Your doctor will tell you how often to have this done based on your overall health and other things that can increase your risk for heart attack and stroke. · Blood pressure. Have your blood pressure checked during a routine doctor visit. Your doctor will tell you how often to check your blood pressure based on your age, your blood pressure results, and other factors. · Prostate exam. Talk to your doctor about whether you should have a blood test (called a PSA test) for prostate cancer. Experts disagree on whether men should have this test. Some experts recommend that you discuss the benefits and risks of the test with your doctor. · Diabetes. Ask your doctor whether you should have tests for diabetes. · Vision. Some experts recommend that you have yearly exams for glaucoma and other age-related eye problems starting at age 48. · Hearing. Tell your doctor if you notice any change in your hearing. You can have tests to find out how well you hear. · Colon cancer. You should begin tests for colon cancer at age 48. You may have one of several tests. Your doctor will tell you how often to have tests based on your age and risk. Risks include whether you already had a precancerous polyp removed from your colon or whether your parent, brother, sister, or child has had colon cancer. · Heart attack and stroke risk. At least every 4 to 6 years, you should have your risk for heart attack and stroke assessed. Your doctor uses factors such as your age, blood pressure, cholesterol, and whether you smoke or have diabetes to show what your risk for a heart attack or stroke is over the next 10 years. · Abdominal aortic aneurysm. Ask your doctor whether you should have a test to check for an aneurysm. You may need a test if you ever smoked or if your parent, brother, sister, or child has had an aneurysm. When should you call for help? Watch closely for changes in your health, and be sure to contact your doctor if you have any problems or symptoms that concern you. Where can you learn more? Go to http://samir-nael.info/. Enter B376 in the search box to learn more about \"Well Visit, Men 48 to 72: Care Instructions. \" Current as of: Reina 10, 2017 Content Version: 11.7 © 6661-3440 [employer]. Care instructions adapted under license by [employer] (which disclaims liability or warranty for this information). If you have questions about a medical condition or this instruction, always ask your healthcare professional. [place] [address] any warranty or liability for your use of this information. Body Mass Index: Care Instructions Your Care Instructions Body mass index (BMI) can help you see if your weight is raising your risk for health problems. It uses a formula to compare how much you weigh with how tall you are. · A BMI lower than 18.5 is considered underweight. · A BMI between 18.5 and 24.9 is considered healthy. · A BMI between 25 and 29.9 is considered overweight. A BMI of 30 or higher is considered obese. If your BMI is in the normal range, it means that you have a lower risk for weight-related health problems. If your BMI is in the overweight or obese range, you may be at increased risk for weight-related health problems, such as high blood pressure, heart disease, stroke, arthritis or joint pain, and diabetes. If your BMI is in the underweight range, you may be at increased risk for health problems such as fatigue, lower protection (immunity) against illness, muscle loss, bone loss, hair loss, and hormone problems. BMI is just one measure of your risk for weight-related health problems. You may be at higher risk for health problems if you are not active, you eat an unhealthy diet, or you drink too much alcohol or use tobacco products. Follow-up care is a key part of your treatment and safety. Be sure to make and go to all appointments, and call your doctor if you are having problems.  It's also a good idea to know your test results and keep a list of the medicines you take. How can you care for yourself at home? · Practice healthy eating habits. This includes eating plenty of fruits, vegetables, whole grains, lean protein, and low-fat dairy. · If your doctor recommends it, get more exercise. Walking is a good choice. Bit by bit, increase the amount you walk every day. Try for at least 30 minutes on most days of the week. · Do not smoke. Smoking can increase your risk for health problems. If you need help quitting, talk to your doctor about stop-smoking programs and medicines. These can increase your chances of quitting for good. · Limit alcohol to 2 drinks a day for men and 1 drink a day for women. Too much alcohol can cause health problems. If you have a BMI higher than 25 · Your doctor may do other tests to check your risk for weight-related health problems. This may include measuring the distance around your waist. A waist measurement of more than 40 inches in men or 35 inches in women can increase the risk of weight-related health problems. · Talk with your doctor about steps you can take to stay healthy or improve your health. You may need to make lifestyle changes to lose weight and stay healthy, such as changing your diet and getting regular exercise. If you have a BMI lower than 18.5 · Your doctor may do other tests to check your risk for health problems. · Talk with your doctor about steps you can take to stay healthy or improve your health. You may need to make lifestyle changes to gain or maintain weight and stay healthy, such as getting more healthy foods in your diet and doing exercises to build muscle. Where can you learn more? Go to http://samir-nael.info/. Enter S176 in the search box to learn more about \"Body Mass Index: Care Instructions. \" Current as of: October 13, 2016 Content Version: 11.4 © 7655-3484 [employer].  Care instructions adapted under license by [address] (which disclaims liability or warranty for this information). If you have questions about a medical condition or this instruction, always ask your healthcare professional. [address] any warranty or liability for your use of this information.

## 2018-08-28 NOTE — PROGRESS NOTES
1. Have you been to the ER, urgent care clinic since your last visit? Hospitalized since your last visit? Yes When: [facility] Reason for visit: [facility] 2. Have you seen or consulted any other health care providers outside of the [address] [place] [address] since your last visit? Include any pap smears or colon screening.  No

## 2018-08-28 NOTE — PROGRESS NOTES
HPI:  
Check up No acute issues; feels well 
w/o chest pain/abd. discomfort; no dyspnea, cough or pedal edema; denies constitutional complaints of fever, night sweats or wt loss; no evidence of GI/ hemorrhage; no polyuria/polydipsia. Activity is age appropriate. ROS is otherwise negative. Past Medical History:  
Diagnosis Date  ADHD (attention deficit hyperactivity disorder)  Diverticulosis  GERD (gastroesophageal reflux disease)  Renal cyst   
 (R) Past Surgical History:  
Procedure Laterality Date  HX COLONOSCOPY  2013  
 polyp  HX TONSIL AND ADENOIDECTOMY Social History Social History  Marital status: UNKNOWN Spouse name: N/A  
 Number of children: N/A  
 Years of education: N/A Occupational History  technician Social History Main Topics  Smoking status: Current Every Day Smoker Packs/day: 1.00  Smokeless tobacco: Never Used  Alcohol use 3.6 oz/week 6 Cans of beer per week  Drug use: No  
 Sexual activity: Not on file Other Topics Concern  Not on file Social History Narrative No Known Allergies Family History Problem Relation Age of Onset  Heart Disease Father  Hypertension Father Current Outpatient Prescriptions Medication Sig Dispense Refill  guaiFENesin-codeine (CHERATUSSIN AC) 100-10 mg/5 mL solution 1 or 2 tsp q 6 hrs prn cough 120 mL 1  
 buPROPion SR (WELLBUTRIN SR) 150 mg SR tablet 1 every day for 3 days then increase to 1 bid 60 Tab 6 Visit Vitals  /82 (BP 1 Location: Left arm, BP Patient Position: Sitting)  Pulse 71  Temp 97.6 °F (36.4 °C) (Tympanic)  Resp 16  
 Ht 5' 8\" (1.727 m)  Wt 210 lb (95.3 kg)  SpO2 97%  BMI 31.93 kg/m2 PE Well nourished in NAD HEENT:  OP: clear. Neck: supple w/o mass or bruits. Chest: clear. CV: RRR w/o m,r,g; pulses intact. Abd: soft, NT, w/o HSM or mass. Rectal: heme neg; prostate 3 FBS and smooth Ext: w/o edema. Neuro: NF. Assessment and Plan Encounter Diagnoses Name Primary?  Routine general medical examination at a health care facility Yes Except for weight, appears well Labs soon to assess metabolic parameters Stanton advised (polyp 2013) Continue dietary/exercise efforts; stop smoking (using wellbutrin as cessation aid) Flu vaccine advised (he declined) The patient was counseled on the dangers of tobacco use, and was advised to quit. Reviewed strategies to maximize success, including removing cigarettes and smoking materials from environment. OV 12 mos or prn I have explained plan to patient and the patient verbalizes understanding Discussed the patient's BMI with him. The BMI follow up plan is as follows:  
 
dietary management education, guidance, and counseling 
encourage exercise 
monitor weight 
prescribed dietary intake An After Visit Summary was printed and given to the patient.

## 2022-03-19 PROBLEM — N28.1 RENAL CYST: Status: ACTIVE | Noted: 2017-06-22

## 2022-03-20 PROBLEM — R41.3 MEMORY DISTURBANCE: Status: ACTIVE | Noted: 2017-12-18

## 2022-03-20 PROBLEM — F17.210 CIGARETTE SMOKER: Status: ACTIVE | Noted: 2017-06-22

## 2023-05-18 RX ORDER — GUAIFENESIN AND CODEINE PHOSPHATE 100; 10 MG/5ML; MG/5ML
SOLUTION ORAL
COMMUNITY
Start: 2018-01-08

## 2023-05-18 RX ORDER — BUPROPION HYDROCHLORIDE 150 MG/1
TABLET, EXTENDED RELEASE ORAL
COMMUNITY
Start: 2017-12-18